# Patient Record
Sex: FEMALE | Race: WHITE | Employment: UNEMPLOYED | URBAN - METROPOLITAN AREA
[De-identification: names, ages, dates, MRNs, and addresses within clinical notes are randomized per-mention and may not be internally consistent; named-entity substitution may affect disease eponyms.]

---

## 2021-01-21 ENCOUNTER — VIRTUAL VISIT (OUTPATIENT)
Dept: FAMILY MEDICINE CLINIC | Age: 36
End: 2021-01-21
Payer: OTHER GOVERNMENT

## 2021-01-21 DIAGNOSIS — F33.8 SEASONAL AFFECTIVE DISORDER (HCC): Primary | ICD-10-CM

## 2021-01-21 DIAGNOSIS — L70.0 CYSTIC ACNE: ICD-10-CM

## 2021-01-21 PROCEDURE — 99203 OFFICE O/P NEW LOW 30 MIN: CPT | Performed by: NURSE PRACTITIONER

## 2021-01-21 RX ORDER — AZELAIC ACID 0.15 G/G
GEL TOPICAL 2 TIMES DAILY
COMMUNITY
End: 2021-01-21 | Stop reason: SDUPTHER

## 2021-01-21 RX ORDER — CHLORTHALIDONE 25 MG/1
25 TABLET ORAL DAILY
COMMUNITY
End: 2021-05-19 | Stop reason: SDUPTHER

## 2021-01-21 RX ORDER — SERTRALINE HYDROCHLORIDE 100 MG/1
100 TABLET, FILM COATED ORAL DAILY
COMMUNITY
End: 2021-05-10 | Stop reason: SDUPTHER

## 2021-01-21 NOTE — PROGRESS NOTES
Jerrod Myers was seen by synchronous (real-time) audio-video technology DOXY on 1/21/2021. Consent: Jerrod Myers, who was seen by synchronous (real-time) audio-video technology, and/or her healthcare decision maker, is aware that this patient-initiated, Telehealth encounter on 1/21/2021 is a billable service, with coverage as determined by her insurance carrier. She is aware that she may receive a bill and has provided verbal consent to proceed: yes  712  Subjective:     HPI:  Jerrod Myers is a 38 y.o. female who was seen for the following:     Presents to establish care with me as PCP. Previous PCP was out of the area. Just moved to the area. HTN:   She had a recent BP which was 136/85. Had preeclampsia with her pregnancies and her BP stayed high after the last pregnancy. Last physical was about 2 years ago. Had blood work in 8/2020. Last pap was 2018, always normal.     Cystic acne:   Uses azelaic acid cream.     Anxiety:   Taking zoloft 100 mg. Also seasonal affective disorder. Symptoms are manageble with the medication. Has been taking it for two years. Her  deployed several weeks ago and she did have a panic attack which was the first she has had in a long time. Review of Systems   Constitutional: Negative for appetite change, diaphoresis, fatigue and unexpected weight change. Eyes: Negative for visual disturbance. Respiratory: Negative for cough, chest tightness and shortness of breath. Cardiovascular: Negative for chest pain, palpitations and leg swelling. Gastrointestinal: Negative for abdominal distention, abdominal pain, blood in stool, constipation, diarrhea, nausea, rectal pain and vomiting. Endocrine: Negative for polydipsia, polyphagia and polyuria. Genitourinary: Negative for decreased urine volume, dysuria and frequency. Musculoskeletal: Negative for joint swelling and myalgias. Skin: Negative for rash and wound.         acne   Neurological: Negative for dizziness, weakness, light-headedness, numbness and headaches. Psychiatric/Behavioral: Negative for dysphoric mood and sleep disturbance. The patient is nervous/anxious. Past Medical History, Past Surgery History, Allergies, Social History, and Family History were reviewed and updated. Patient Active Problem List   Diagnosis Code    Seasonal affective disorder ([facility] [address] F33.8    Hypertension I10    Cystic acne L70.0    Anxiety F41.9     Past Medical History:   Diagnosis Date    Anxiety     Cystic acne     Hypertension     Seasonal affective disorder [facility])      Patient Care Team:  Kevyn Vyas NP as PCP - General (Nurse Practitioner)  Kevyn Vyas NP as PCP - [facility]    Past Surgical History:   Procedure Laterality Date    HX  SECTION      , , 2019    HX MENISCECTOMY     [address]    foot, repair of fracture    HX ORTHOPAEDIC  2003    left shoulder surgery     Family History   Problem Relation Age of Onset    Hypertension Mother     Hypertension Father     Cancer Father         prostate    Heart Attack Father         x2    Diabetes Maternal Aunt      Social History     Tobacco Use    Smoking status: Never Smoker    Smokeless tobacco: Never Used   Substance Use Topics    Alcohol use: Not Currently     Frequency: Never     Binge frequency: Never    Drug use: Never     Not on File  Current Outpatient Medications on File Prior to Visit   Medication Sig Dispense Refill    chlorthalidone (HYGROTON) 25 mg tablet Take 25 mg by mouth daily.  sertraline (ZOLOFT) 100 mg tablet Take 100 mg by mouth daily. No current facility-administered medications on file prior to visit.       Health Maintenance Due   Topic Date Due    DTaP/Tdap/Td series (1 - Tdap) 2004    PAP AKA CERVICAL CYTOLOGY  2004    Flu Vaccine (1) 2020         Objective     PHYSICAL EXAMINATION:    Vital Signs: (As obtained by patient/caregiver at home)   Patient-Reported Vitals 1/21/2021   Patient-Reported Weight 145   Patient-Reported Height 51   Patient-Reported LMP 12/23          General: Alert, cooperative, no distress   Mental  status: Normal mood, behavior, speech, dress, motor activity, and thought processes, able to follow commands   HENT: Normocephalic, atraumatic   Neck: No visualized mass   Resp: No respiratory distress   Neuro: No gross deficits   Skin: No discoloration or lesions of concern on visible areas   Psychiatric: Normal affect, consistent with stated mood, no evidence of hallucinations     Additional exam findings: none      LABS     TESTS      Assessment and Plan     1. Seasonal affective disorder ([address]  Well controlled with current med. Continue. 2. Cystic acne  Azelaic acid was effective previously. Restart.   - azelaic acid (AZELEX) 20 % topical cream; Apply  to affected area two (2) times a day. Dispense: 50 g; Refill: 11      Follow-up and Dispositions    · Return for Annual physical, in person. 712  We discussed the expected course, resolution and complications of the diagnosis(es) in detail. Medication risks, benefits, costs, interactions, and alternatives were discussed as indicated. I advised her to contact the office if her condition worsens, changes or fails to improve as anticipated. She expressed understanding with the diagnosis(es) and plan. Levsia Miller is a 38 y.o. female who was evaluated by a video visit encounter for concerns as above. Patient identification was verified prior to start of the visit. A caregiver was present when appropriate. Due to this being a TeleHealth encounter (During [facility]- public health emergency), evaluation of the following organ systems was limited: Vitals/Constitutional/EENT/Resp/CV/GI//MS/Neuro/Skin/Heme-Lymph-Imm.   Pursuant to the emergency declaration under the [facility] waiver authority and the Coronavirus Preparedness and Response Supplemental Appropriations Act, this Virtual  Visit was conducted, with patient's (and/or legal guardian's) consent, to reduce the patient's risk of exposure to COVID-19 and provide necessary medical care.     Services were provided through a video synchronous discussion virtually to substitute for in-person clinic visit.   Patient was located at home and provider was located at home.      Signed electronically by Ev Renee DNP, FNP-BC

## 2021-02-15 ENCOUNTER — HOSPITAL ENCOUNTER (OUTPATIENT)
Dept: LAB | Age: 36
Discharge: HOME | End: 2021-02-15
Payer: OTHER GOVERNMENT

## 2021-02-15 ENCOUNTER — OFFICE VISIT (OUTPATIENT)
Dept: FAMILY MEDICINE CLINIC | Age: 36
End: 2021-02-15
Payer: OTHER GOVERNMENT

## 2021-02-15 VITALS
DIASTOLIC BLOOD PRESSURE: 83 MMHG | SYSTOLIC BLOOD PRESSURE: 127 MMHG | TEMPERATURE: 97.5 F | HEART RATE: 58 BPM | OXYGEN SATURATION: 100 % | WEIGHT: 147 LBS | RESPIRATION RATE: 20 BRPM

## 2021-02-15 DIAGNOSIS — F33.8 SEASONAL AFFECTIVE DISORDER (HCC): ICD-10-CM

## 2021-02-15 DIAGNOSIS — L81.9 CHANGE IN MULTIPLE PIGMENTED SKIN LESIONS: ICD-10-CM

## 2021-02-15 DIAGNOSIS — Z13.220 SCREENING CHOLESTEROL LEVEL: ICD-10-CM

## 2021-02-15 DIAGNOSIS — F41.9 ANXIETY: ICD-10-CM

## 2021-02-15 DIAGNOSIS — Z12.4 SCREENING FOR CERVICAL CANCER: ICD-10-CM

## 2021-02-15 DIAGNOSIS — Z00.00 ANNUAL PHYSICAL EXAM: Primary | ICD-10-CM

## 2021-02-15 DIAGNOSIS — Z91.89 HISTORY OF SUN EXPOSURE, MODERATE: ICD-10-CM

## 2021-02-15 DIAGNOSIS — I10 ESSENTIAL HYPERTENSION: ICD-10-CM

## 2021-02-15 LAB
ALBUMIN SERPL-MCNC: 3.8 G/DL (ref 3.4–5)
ALBUMIN/GLOB SERPL: 1.2 {RATIO} (ref 0.8–1.7)
ALP SERPL-CCNC: 55 U/L (ref 45–117)
ALT SERPL-CCNC: 13 U/L (ref 13–56)
ANION GAP SERPL CALC-SCNC: 4 MMOL/L (ref 3–18)
APPEARANCE UR: CLEAR
AST SERPL-CCNC: 10 U/L (ref 10–38)
BASOPHILS # BLD: 0 K/UL (ref 0–0.1)
BASOPHILS NFR BLD: 1 % (ref 0–2)
BILIRUB SERPL-MCNC: 1 MG/DL (ref 0.2–1)
BILIRUB UR QL: NEGATIVE
BUN SERPL-MCNC: 14 MG/DL (ref 7–18)
BUN/CREAT SERPL: 22 (ref 12–20)
CALCIUM SERPL-MCNC: 8.5 MG/DL (ref 8.5–10.1)
CHLORIDE SERPL-SCNC: 104 MMOL/L (ref 100–111)
CHOLEST SERPL-MCNC: 182 MG/DL
CO2 SERPL-SCNC: 32 MMOL/L (ref 21–32)
COLOR UR: YELLOW
CREAT SERPL-MCNC: 0.63 MG/DL (ref 0.6–1.3)
DIFFERENTIAL METHOD BLD: ABNORMAL
EOSINOPHIL # BLD: 0.1 K/UL (ref 0–0.4)
EOSINOPHIL NFR BLD: 2 % (ref 0–5)
ERYTHROCYTE [DISTWIDTH] IN BLOOD BY AUTOMATED COUNT: 12.2 % (ref 11.6–14.5)
GLOBULIN SER CALC-MCNC: 3.3 G/DL (ref 2–4)
GLUCOSE SERPL-MCNC: 76 MG/DL (ref 74–99)
GLUCOSE UR STRIP.AUTO-MCNC: NEGATIVE MG/DL
HCT VFR BLD AUTO: 40.5 % (ref 35–45)
HDLC SERPL-MCNC: 50 MG/DL (ref 40–60)
HDLC SERPL: 3.6 {RATIO} (ref 0–5)
HGB BLD-MCNC: 14.2 G/DL (ref 12–16)
HGB UR QL STRIP: NEGATIVE
KETONES UR QL STRIP.AUTO: NEGATIVE MG/DL
LDLC SERPL CALC-MCNC: 117.6 MG/DL (ref 0–100)
LEUKOCYTE ESTERASE UR QL STRIP.AUTO: NEGATIVE
LIPID PROFILE,FLP: ABNORMAL
LYMPHOCYTES # BLD: 1.8 K/UL (ref 0.9–3.6)
LYMPHOCYTES NFR BLD: 34 % (ref 21–52)
MCH RBC QN AUTO: 32.1 PG (ref 24–34)
MCHC RBC AUTO-ENTMCNC: 35.1 G/DL (ref 31–37)
MCV RBC AUTO: 91.6 FL (ref 74–97)
MONOCYTES # BLD: 0.7 K/UL (ref 0.05–1.2)
MONOCYTES NFR BLD: 12 % (ref 3–10)
NEUTS SEG # BLD: 2.7 K/UL (ref 1.8–8)
NEUTS SEG NFR BLD: 51 % (ref 40–73)
NITRITE UR QL STRIP.AUTO: NEGATIVE
PH UR STRIP: 7 [PH] (ref 5–8)
PLATELET # BLD AUTO: 290 K/UL (ref 135–420)
PMV BLD AUTO: 10.1 FL (ref 9.2–11.8)
POTASSIUM SERPL-SCNC: 3.3 MMOL/L (ref 3.5–5.5)
PROT SERPL-MCNC: 7.1 G/DL (ref 6.4–8.2)
PROT UR STRIP-MCNC: NEGATIVE MG/DL
RBC # BLD AUTO: 4.42 M/UL (ref 4.2–5.3)
SODIUM SERPL-SCNC: 140 MMOL/L (ref 136–145)
SP GR UR REFRACTOMETRY: 1.01 (ref 1–1.03)
TRIGL SERPL-MCNC: 72 MG/DL (ref ?–150)
TSH SERPL DL<=0.05 MIU/L-ACNC: 1.48 UIU/ML (ref 0.36–3.74)
UROBILINOGEN UR QL STRIP.AUTO: 0.2 EU/DL (ref 0.2–1)
VLDLC SERPL CALC-MCNC: 14.4 MG/DL
WBC # BLD AUTO: 5.4 K/UL (ref 4.6–13.2)

## 2021-02-15 PROCEDURE — 81003 URINALYSIS AUTO W/O SCOPE: CPT

## 2021-02-15 PROCEDURE — 80061 LIPID PANEL: CPT

## 2021-02-15 PROCEDURE — 36415 COLL VENOUS BLD VENIPUNCTURE: CPT

## 2021-02-15 PROCEDURE — 88175 CYTOPATH C/V AUTO FLUID REDO: CPT

## 2021-02-15 PROCEDURE — 85025 COMPLETE CBC W/AUTO DIFF WBC: CPT

## 2021-02-15 PROCEDURE — 99395 PREV VISIT EST AGE 18-39: CPT | Performed by: NURSE PRACTITIONER

## 2021-02-15 PROCEDURE — 84443 ASSAY THYROID STIM HORMONE: CPT

## 2021-02-15 PROCEDURE — 87624 HPV HI-RISK TYP POOLED RSLT: CPT

## 2021-02-15 PROCEDURE — 80053 COMPREHEN METABOLIC PANEL: CPT

## 2021-02-15 NOTE — PROGRESS NOTES
Subjective     Patient ID:  Patricia Ramos is a 38 y.o. ( 1985) female who presents for the following:   Physical      HPI     Presents for physical. Feeling well overall. Hypertension follow up:  Taking medications as prescribed: Yes  Checking BP at home: Yes 120-130/80s   Symptoms: none  Low sodium diet: Yes, mostly  Exercise: Yes, works out twice a week, cardio/core, active on the other days. Anxiety:   Taking sertraline which helps make the symptoms manageable. Worse during the fall and winter - seasonal affective disorder. Skin changes:   Reports that she had heavy sun exposure while living in [facility] and has noticed multiple new pigmented skin lesions in sun exposed areas. Review of Systems   Constitutional: Negative for appetite change, diaphoresis, fatigue, fever and unexpected weight change. HENT: Negative for congestion, sore throat and trouble swallowing. Eyes: Negative for visual disturbance. Respiratory: Negative for cough, chest tightness and shortness of breath. Cardiovascular: Negative for chest pain, palpitations and leg swelling. Gastrointestinal: Negative for abdominal distention, abdominal pain, anal bleeding, blood in stool, constipation, diarrhea, nausea, rectal pain and vomiting. Endocrine: Negative for polydipsia, polyphagia and polyuria. Genitourinary: Negative for decreased urine volume, difficulty urinating, dysuria, frequency, hematuria and urgency. Musculoskeletal: Negative for arthralgias, joint swelling and myalgias. Skin: Positive for color change. Negative for rash and wound. Neurological: Negative for dizziness, syncope, weakness, light-headedness, numbness and headaches. Psychiatric/Behavioral: Negative for dysphoric mood, sleep disturbance and suicidal ideas. The patient is nervous/anxious. Past Medical History, Past Surgery History, Allergies, Social History, and Family History were reviewed and updated. Patient Active Problem List   Diagnosis Code    Seasonal affective disorder ([address] F33.8    Hypertension I10    Cystic acne L70.0    Anxiety F41.9     Past Medical History:   Diagnosis Date    Anxiety     Cystic acne     Hypertension     Seasonal affective disorder [facility])      Patient Care Team:  John Rangel NP as PCP - General (Nurse Practitioner)  John Rangel [facility] Empaneled Provider    Past Surgical History:   Procedure Laterality Date    HX  SECTION      , , 2019    HX MENISCECTOMY     [address]    foot, repair of fracture    HX ORTHOPAEDIC  2003    left shoulder surgery     Family History   Problem Relation Age of Onset    Hypertension Mother     Hypertension Father     Cancer Father         prostate    Heart Attack Father         x2    Diabetes Maternal Aunt      Social History     Tobacco Use    Smoking status: Never Smoker    Smokeless tobacco: Never Used   Substance Use Topics    Alcohol use: Not Currently     Frequency: Never     Binge frequency: Never    Drug use: Never     Not on File  Current Outpatient Medications on File Prior to Visit   Medication Sig Dispense Refill    chlorthalidone (HYGROTON) 25 mg tablet Take 25 mg by mouth daily.  sertraline (ZOLOFT) 100 mg tablet Take 100 mg by mouth daily.  azelaic acid (AZELEX) 20 % topical cream Apply  to affected area two (2) times a day. 50 g 11     No current facility-administered medications on file prior to visit. Health Maintenance Due   Topic Date Due    Hepatitis C Screening  Never done    DTaP/Tdap/Td series (1 - Tdap) Never done    Flu Vaccine (1) Never done         Objective     Visit Vitals  /83   Pulse (!) 58   Temp 97.5 °F (36.4 °C)   Resp 20   Wt 147 lb (66.7 kg)   LMP 2021   SpO2 100%     Patient's last menstrual period was 2021. Physical Exam  Constitutional:       General: She is not in acute distress.      Appearance: Normal appearance. She is well-developed. She is not diaphoretic. HENT:      Head: Normocephalic and atraumatic. Right Ear: Hearing, tympanic membrane, ear canal and external ear normal.      Left Ear: Hearing, tympanic membrane, ear canal and external ear normal.      Nose: Nose normal.   Eyes:      Conjunctiva/sclera: Conjunctivae normal.      Pupils: Pupils are equal, round, and reactive to light. Neck:      Musculoskeletal: Neck supple. Thyroid: No thyroid mass or thyromegaly. Vascular: No carotid bruit. Cardiovascular:      Rate and Rhythm: Normal rate and regular rhythm. Heart sounds: Normal heart sounds. No murmur. No friction rub. No gallop. Pulmonary:      Effort: Pulmonary effort is normal. No respiratory distress. Breath sounds: Normal breath sounds. Abdominal:      General: Bowel sounds are normal. There is no distension. Palpations: Abdomen is soft. There is no mass or pulsatile mass. Tenderness: There is no abdominal tenderness. Genitourinary:     Labia:         Right: No rash, tenderness, lesion or injury. Left: No rash, tenderness, lesion or injury. Vagina: No signs of injury and foreign body. No vaginal discharge, erythema, tenderness or bleeding. Cervix: No cervical motion tenderness, discharge or friability. Adnexa:         Right: No mass, tenderness or fullness. Left: No mass, tenderness or fullness. Musculoskeletal: Normal range of motion. General: No tenderness or deformity. Right lower leg: No edema. Left lower leg: No edema. Lymphadenopathy:      Cervical: No cervical adenopathy. Skin:     General: Skin is warm and dry. Findings: No rash. Neurological:      Mental Status: She is alert and oriented to person, place, and time. Cranial Nerves: No cranial nerve deficit. Sensory: No sensory deficit.       Gait: Gait normal.      Deep Tendon Reflexes: Reflexes are normal and symmetric. Psychiatric:         Behavior: Behavior normal.         Thought Content: Thought content normal.         Judgment: Judgment normal.           LABS     TESTS      Assessment and Plan     1. Annual physical exam  Encouraged healthy lifestyle. 2. Essential hypertension  Well controlled per home readings. Fasting labs today. - CBC WITH AUTOMATED DIFF; Future  - METABOLIC PANEL, COMPREHENSIVE; Future  - TSH 3RD GENERATION; Future  - URINALYSIS W/ RFLX MICROSCOPIC; Future    3. Seasonal affective disorder ([address]  4. Anxiety  Well controlled on current meds. Continue. 5. Screening cholesterol level  - LIPID PANEL; Future    6. Screening for cervical cancer  Pap done today. - PAP IG, APTIMA HPV AND RFX 16/18,45 ([id number]); Future    7. History of sun exposure, moderate  - REFERRAL TO DERMATOLOGY    8. Change in multiple pigmented skin lesions  - REFERRAL TO DERMATOLOGY      Follow-up and Dispositions    · Return in about 6 months (around 8/15/2021) for High blood pressure follow up, in person. Risks, benefits, and alternatives of the medications and treatment plan prescribed today were discussed, and patient expressed understanding. Printed after visit summary was given to patient and reviewed. All patient questions and concerns were addressed. Plan follow-up as discussed or as needed if any worsening symptoms or change in condition.            Signed electronically by Jamal Guevara DNP, SULTANA-BC

## 2021-02-15 NOTE — PROGRESS NOTES
Thomas Loja presents today for   Chief Complaint   Patient presents with    Physical       Is someone accompanying this pt? no    Is the patient using any DME equipment during OV? no    Depression Screening:  No flowsheet data found. Learning Assessment:  No flowsheet data found. Abuse Screening:  No flowsheet data found. Fall Risk  Fall Risk Assessment, last 12 mths 2/15/2021   Able to walk? Yes       Health Maintenance reviewed and discussed and ordered per Provider. Health Maintenance Due   Topic Date Due    DTaP/Tdap/Td series (1 - Tdap) 03/12/2004    PAP AKA CERVICAL CYTOLOGY  03/12/2004    Flu Vaccine (1) 09/01/2020   . Coordination of Care:  1. Have you been to the ER, urgent care clinic since your last visit? Hospitalized since your last visit? no    2. Have you seen or consulted any other health care providers outside of the [address] [place] [address] since your last visit? Include any pap smears or colon screening.  no      Last  Checked na  Last UDS Checked na  Last Pain contract signed: etienne

## 2021-02-15 NOTE — PATIENT INSTRUCTIONS
We will call or send you a MePlease message or letter regarding your lab/imaging results. If you do not hear anything in 2 weeks, then call our office back for your results. Healthy Lifestyle Choices The choices you make every day determine whether you are likely to stay healthy or be at risk of developing or worsening chronic diseases like diabetes and high blood pressure. Weight: Your There is no height or weight on file to calculate BMI. Body mass index or BMI is only an estimate and does not include factors such as your muscle mass. In general it is healthy to have a BMI between 18.5 and 25. Nutrition: I recommend a diet high in vegetables, low in carbs and sugars, and low in processed foods. The Mediterranean diet is a good guideline for most people even if you are already at a healthy weight. If you would like more guidance, schedule an appointment with me to discuss your nutrition. Hydration: Drink about 8-10 cups of water daily. Avoid soda and other sweetened beverages. Exercise: Aim for at least 30 minutes of physical activity 5 days a week. If you have been sedentary for a while, start with light exercise and gradually increase your activity. Exercise should be something you enjoy and can easily incorporate into your daily routine. Examples: a walk at lunchtime, playing outdoors with your kids, swimming, dancing, yoga, etc.  
 
Sleep: Most people need 7-8 hours of uninterrupted sleep. Talk to me if you are having problems sleeping. Stress: Get at least 20-30 minutes of relaxation daily such as meditation, time with family and friends, pets, exercise, hobbies, etc.  
 
Seatbelt: Always wear your seatbelt! Sunscreen: A little sun exposure (10-20 minutes per day depending on your skin tone) supports healthy vitamin D levels, but avoid excessive sun exposure and sunburn. Dental care: Get a dental check-up and cleaning every 6 months.

## 2021-02-25 DIAGNOSIS — E87.6 HYPOKALEMIA: Primary | ICD-10-CM

## 2021-05-10 DIAGNOSIS — F33.8 SEASONAL AFFECTIVE DISORDER (HCC): ICD-10-CM

## 2021-05-10 DIAGNOSIS — F41.9 ANXIETY: Primary | ICD-10-CM

## 2021-05-10 DIAGNOSIS — L70.0 CYSTIC ACNE: ICD-10-CM

## 2021-05-10 RX ORDER — SERTRALINE HYDROCHLORIDE 100 MG/1
100 TABLET, FILM COATED ORAL DAILY
Qty: 30 TAB | Refills: 0 | Status: SHIPPED | OUTPATIENT
Start: 2021-05-10 | End: 2021-05-19 | Stop reason: SDUPTHER

## 2021-05-19 ENCOUNTER — VIRTUAL VISIT (OUTPATIENT)
Dept: FAMILY MEDICINE CLINIC | Age: 36
End: 2021-05-19
Payer: OTHER GOVERNMENT

## 2021-05-19 DIAGNOSIS — F33.8 SEASONAL AFFECTIVE DISORDER (HCC): ICD-10-CM

## 2021-05-19 DIAGNOSIS — I10 ESSENTIAL HYPERTENSION: Primary | ICD-10-CM

## 2021-05-19 DIAGNOSIS — F41.9 ANXIETY: ICD-10-CM

## 2021-05-19 PROCEDURE — 99212 OFFICE O/P EST SF 10 MIN: CPT | Performed by: NURSE PRACTITIONER

## 2021-05-19 RX ORDER — SERTRALINE HYDROCHLORIDE 100 MG/1
100 TABLET, FILM COATED ORAL DAILY
Qty: 90 TABLET | Refills: 0 | Status: SHIPPED | OUTPATIENT
Start: 2021-05-19 | End: 2021-08-17

## 2021-05-19 RX ORDER — CHLORTHALIDONE 25 MG/1
25 TABLET ORAL DAILY
Qty: 90 TABLET | Refills: 0 | Status: SHIPPED | OUTPATIENT
Start: 2021-05-19 | End: 2021-09-01 | Stop reason: SDUPTHER

## 2021-05-19 NOTE — PROGRESS NOTES
HISTORY OF PRESENT ILLNESS  Sola Mendez is a 43 y.o. female seen to establish care and follow-up on hypertension and anxiety  Sola Mendez, who was evaluated through a synchronous (real-time) audio-video encounter, and/or her healthcare decision maker, is aware that it is a billable service, with coverage as determined by her insurance carrier. She provided verbal consent to proceed: Yes, and patient identification was verified. This visit was conducted pursuant to the emergency declaration under the [facility], [address] [place] [address] and the [employer] General Act. A caregiver was present when appropriate. Ability to conduct physical exam was limited. The patient was located in a state where the provider was credentialed to provide care. --Yonatan Watson NP on 5/19/2021 at 9:45 AM  HPI  NP Agnes Hayward patient. Patient is establishing care with me today. Patient has no complaints. Patient has a history of hypertension cystic acne, seasonal affective disorder and anxiety. Patient says she is doing well and she looks well. She is asking for refills on her chlorthalidone and and Zoloft. Patient is in office blood pressure February 2021 was 127/83. Patient denies fever, chills, chest pain, shortness of breath, abdomen pain or dark tarry stool. Patient reports her appetite is good, no urinary issues and regular bowel movements. Patient had covid last year while living in [place]. Doesn't want covid vaccine, \"not enough research. \".   Patient reports that she and her family are going on vacation and she would like to follow-up in August for a face-to-face appointment. Patient and I discussed at home blood pressure monitoring. However patient gets anxious when she checks her blood pressure so she says she does not do it that often.     Review of Systems   Constitutional: Negative for chills, fever, malaise/fatigue and weight loss. HENT: Negative. Eyes: Negative. Respiratory: Negative. Cardiovascular: Negative. Gastrointestinal: Negative. Genitourinary: Negative. Musculoskeletal: Negative. Skin: Negative. Neurological: Negative. Endo/Heme/Allergies: Negative. Psychiatric/Behavioral: Negative for depression. The patient is not nervous/anxious. Patient reports Zoloft has been working well for her     There were no vitals taken for this visit. Physical Exam  Constitutional:       General: She is not in acute distress. Appearance: Normal appearance. She is normal weight. She is not ill-appearing. Eyes:      General:         Right eye: No discharge. Left eye: No discharge. Conjunctiva/sclera: Conjunctivae normal.   Neck:      Comments: No visible mass  Pulmonary:      Effort: Pulmonary effort is normal.   Neurological:      Mental Status: She is alert and oriented to person, place, and time. Psychiatric:         Mood and Affect: Mood normal.         Behavior: Behavior normal.         Thought Content: Thought content normal.         Judgment: Judgment normal.     ASSESSMENT and PLAN    ICD-10-CM ICD-9-CM    1. Essential hypertension  I10 401.9 chlorthalidone (HYGROTON) 25 mg tablet   2. Anxiety  F41.9 300.00 sertraline (ZOLOFT) 100 mg tablet   3. Seasonal affective disorder (HCC)  F33.8 296.99 sertraline (ZOLOFT) 100 mg tablet     Follow-up and Dispositions    · Return in about 3 months (around 8/19/2021). 50% of 15 minutes spent on counseling and managing care. Follow-up in 3 months.

## 2021-09-01 DIAGNOSIS — F33.8 SEASONAL AFFECTIVE DISORDER (HCC): Primary | ICD-10-CM

## 2021-09-01 DIAGNOSIS — I10 ESSENTIAL HYPERTENSION: ICD-10-CM

## 2021-09-01 DIAGNOSIS — F41.9 ANXIETY: ICD-10-CM

## 2021-09-01 DIAGNOSIS — L70.0 CYSTIC ACNE: ICD-10-CM

## 2021-09-01 RX ORDER — CHLORTHALIDONE 25 MG/1
25 TABLET ORAL DAILY
Qty: 90 TABLET | Refills: 0 | Status: SHIPPED | OUTPATIENT
Start: 2021-09-01 | End: 2021-10-12 | Stop reason: SDUPTHER

## 2021-09-01 RX ORDER — SERTRALINE HYDROCHLORIDE 100 MG/1
100 TABLET, FILM COATED ORAL DAILY
Qty: 90 TABLET | Refills: 0 | Status: SHIPPED | OUTPATIENT
Start: 2021-09-01 | End: 2021-10-12 | Stop reason: SDUPTHER

## 2021-10-12 ENCOUNTER — HOSPITAL ENCOUNTER (OUTPATIENT)
Dept: LAB | Age: 36
Discharge: HOME | End: 2021-10-12
Payer: OTHER GOVERNMENT

## 2021-10-12 ENCOUNTER — OFFICE VISIT (OUTPATIENT)
Dept: FAMILY MEDICINE CLINIC | Age: 36
End: 2021-10-12
Payer: OTHER GOVERNMENT

## 2021-10-12 VITALS
HEART RATE: 87 BPM | BODY MASS INDEX: 28.25 KG/M2 | WEIGHT: 149.6 LBS | SYSTOLIC BLOOD PRESSURE: 119 MMHG | DIASTOLIC BLOOD PRESSURE: 83 MMHG | HEIGHT: 61 IN | TEMPERATURE: 98.1 F | OXYGEN SATURATION: 99 %

## 2021-10-12 DIAGNOSIS — Z86.39 HISTORY OF HYPOKALEMIA: ICD-10-CM

## 2021-10-12 DIAGNOSIS — R10.2 PELVIC PAIN: ICD-10-CM

## 2021-10-12 DIAGNOSIS — Z13.1 SCREENING FOR DIABETES MELLITUS: ICD-10-CM

## 2021-10-12 DIAGNOSIS — Z13.29 SCREENING FOR THYROID DISORDER: ICD-10-CM

## 2021-10-12 DIAGNOSIS — Z13.0 SCREENING FOR DEFICIENCY ANEMIA: ICD-10-CM

## 2021-10-12 DIAGNOSIS — F33.8 SEASONAL AFFECTIVE DISORDER (HCC): ICD-10-CM

## 2021-10-12 DIAGNOSIS — I10 PRIMARY HYPERTENSION: ICD-10-CM

## 2021-10-12 DIAGNOSIS — I10 ESSENTIAL HYPERTENSION: ICD-10-CM

## 2021-10-12 DIAGNOSIS — Z00.00 PREVENTATIVE HEALTH CARE: ICD-10-CM

## 2021-10-12 DIAGNOSIS — F41.9 ANXIETY: ICD-10-CM

## 2021-10-12 DIAGNOSIS — Z11.52 ENCOUNTER FOR SCREENING FOR COVID-19: ICD-10-CM

## 2021-10-12 DIAGNOSIS — Z13.220 SCREENING FOR HYPERLIPIDEMIA: ICD-10-CM

## 2021-10-12 DIAGNOSIS — I10 PRIMARY HYPERTENSION: Primary | ICD-10-CM

## 2021-10-12 LAB
25(OH)D3 SERPL-MCNC: 40.2 NG/ML (ref 30–100)
ALBUMIN SERPL-MCNC: 3.8 G/DL (ref 3.4–5)
ALBUMIN/GLOB SERPL: 1.2 {RATIO} (ref 0.8–1.7)
ALP SERPL-CCNC: 63 U/L (ref 45–117)
ALT SERPL-CCNC: 13 U/L (ref 13–56)
ANION GAP SERPL CALC-SCNC: 3 MMOL/L (ref 3–18)
APPEARANCE UR: CLEAR
AST SERPL-CCNC: 8 U/L (ref 10–38)
BASOPHILS # BLD: 0 K/UL (ref 0–0.1)
BASOPHILS NFR BLD: 1 % (ref 0–2)
BILIRUB SERPL-MCNC: 0.4 MG/DL (ref 0.2–1)
BILIRUB UR QL: NEGATIVE
BUN SERPL-MCNC: 14 MG/DL (ref 7–18)
BUN/CREAT SERPL: 18 (ref 12–20)
CALCIUM SERPL-MCNC: 9.5 MG/DL (ref 8.5–10.1)
CHLORIDE SERPL-SCNC: 104 MMOL/L (ref 100–111)
CHOLEST SERPL-MCNC: 213 MG/DL
CO2 SERPL-SCNC: 33 MMOL/L (ref 21–32)
COLOR UR: YELLOW
CREAT SERPL-MCNC: 0.79 MG/DL (ref 0.6–1.3)
DIFFERENTIAL METHOD BLD: ABNORMAL
EOSINOPHIL # BLD: 0.1 K/UL (ref 0–0.4)
EOSINOPHIL NFR BLD: 1 % (ref 0–5)
ERYTHROCYTE [DISTWIDTH] IN BLOOD BY AUTOMATED COUNT: 12.1 % (ref 11.6–14.5)
EST. AVERAGE GLUCOSE BLD GHB EST-MCNC: 88 MG/DL
GLOBULIN SER CALC-MCNC: 3.3 G/DL (ref 2–4)
GLUCOSE SERPL-MCNC: 64 MG/DL (ref 74–99)
GLUCOSE UR STRIP.AUTO-MCNC: NEGATIVE MG/DL
HBA1C MFR BLD: 4.7 % (ref 4.2–5.6)
HCT VFR BLD AUTO: 41.4 % (ref 35–45)
HDLC SERPL-MCNC: 43 MG/DL (ref 40–60)
HDLC SERPL: 5 {RATIO} (ref 0–5)
HGB BLD-MCNC: 14.4 G/DL (ref 12–16)
HGB UR QL STRIP: NEGATIVE
KETONES UR QL STRIP.AUTO: NEGATIVE MG/DL
LDLC SERPL CALC-MCNC: 138.6 MG/DL (ref 0–100)
LEUKOCYTE ESTERASE UR QL STRIP.AUTO: NEGATIVE
LIPID PROFILE,FLP: ABNORMAL
LYMPHOCYTES # BLD: 1.6 K/UL (ref 0.9–3.6)
LYMPHOCYTES NFR BLD: 36 % (ref 21–52)
MCH RBC QN AUTO: 31.6 PG (ref 24–34)
MCHC RBC AUTO-ENTMCNC: 34.8 G/DL (ref 31–37)
MCV RBC AUTO: 91 FL (ref 78–100)
MONOCYTES # BLD: 0.5 K/UL (ref 0.05–1.2)
MONOCYTES NFR BLD: 11 % (ref 3–10)
NEUTS SEG # BLD: 2.3 K/UL (ref 1.8–8)
NEUTS SEG NFR BLD: 51 % (ref 40–73)
NITRITE UR QL STRIP.AUTO: NEGATIVE
PH UR STRIP: 7 [PH] (ref 5–8)
PLATELET # BLD AUTO: 322 K/UL (ref 135–420)
PMV BLD AUTO: 9.6 FL (ref 9.2–11.8)
POTASSIUM SERPL-SCNC: 3.6 MMOL/L (ref 3.5–5.5)
PROT SERPL-MCNC: 7.1 G/DL (ref 6.4–8.2)
PROT UR STRIP-MCNC: NEGATIVE MG/DL
RBC # BLD AUTO: 4.55 M/UL (ref 4.2–5.3)
SODIUM SERPL-SCNC: 140 MMOL/L (ref 136–145)
SP GR UR REFRACTOMETRY: 1 (ref 1–1.03)
T4 FREE SERPL-MCNC: 0.9 NG/DL (ref 0.7–1.5)
TRIGL SERPL-MCNC: 157 MG/DL (ref ?–150)
TSH SERPL DL<=0.05 MIU/L-ACNC: 1.07 UIU/ML (ref 0.36–3.74)
UROBILINOGEN UR QL STRIP.AUTO: 0.2 EU/DL (ref 0.2–1)
VLDLC SERPL CALC-MCNC: 31.4 MG/DL
WBC # BLD AUTO: 4.4 K/UL (ref 4.6–13.2)

## 2021-10-12 PROCEDURE — 36415 COLL VENOUS BLD VENIPUNCTURE: CPT

## 2021-10-12 PROCEDURE — 82306 VITAMIN D 25 HYDROXY: CPT

## 2021-10-12 PROCEDURE — 80053 COMPREHEN METABOLIC PANEL: CPT

## 2021-10-12 PROCEDURE — 84443 ASSAY THYROID STIM HORMONE: CPT

## 2021-10-12 PROCEDURE — 83036 HEMOGLOBIN GLYCOSYLATED A1C: CPT

## 2021-10-12 PROCEDURE — 81003 URINALYSIS AUTO W/O SCOPE: CPT

## 2021-10-12 PROCEDURE — 99214 OFFICE O/P EST MOD 30 MIN: CPT | Performed by: NURSE PRACTITIONER

## 2021-10-12 PROCEDURE — 85025 COMPLETE CBC W/AUTO DIFF WBC: CPT

## 2021-10-12 PROCEDURE — 84439 ASSAY OF FREE THYROXINE: CPT

## 2021-10-12 PROCEDURE — 80061 LIPID PANEL: CPT

## 2021-10-12 RX ORDER — SERTRALINE HYDROCHLORIDE 100 MG/1
100 TABLET, FILM COATED ORAL DAILY
Qty: 90 TABLET | Refills: 0 | Status: SHIPPED | OUTPATIENT
Start: 2021-10-12 | End: 2022-03-07 | Stop reason: SDUPTHER

## 2021-10-12 RX ORDER — CHLORTHALIDONE 25 MG/1
25 TABLET ORAL DAILY
Qty: 90 TABLET | Refills: 1 | Status: SHIPPED | OUTPATIENT
Start: 2021-10-12 | End: 2022-09-16 | Stop reason: SDUPTHER

## 2021-10-12 RX ORDER — CHOLECALCIFEROL (VITAMIN D3) 125 MCG
CAPSULE ORAL
COMMUNITY

## 2021-10-12 NOTE — PROGRESS NOTES
OFFICE NOTE    Shashi Romero is a 43 y.o. female presenting today for office visit. 10/12/2021  8:06 AM    Chief Complaint   Patient presents with    Follow-up     HTN     Other     request referral for dermatology due to sun spots.  Pelvic Pain     left side before cycle intermitent, heavy bleedings 10/1/21     HPI:   Patient in office for follow-up related to hypertension, seasonal affective disorder anxiety. She was in [facility] with her  job in [employer]. She has a bachelors in finance and wants to get her CPA. Her  is getting out of the [employer] and wants to be a stay at home dad. Her whole family got covid. Chago thfranky Feb 2020 and she was pretty sure it was covid. She would like to have a covid antibody test.     Tubal ligation 2019. She didn't her \"cycle\" back til 3/2020. She intermittently gets pelvic pain at the start of her periods. She thinks she has a cyst on her left. 5/19/2021, previous visit patient has no complaints. Patient has a history of hypertension cystic acne, seasonal affective disorder and anxiety. Patient says she is doing well and she looks well. She is asking for refills on her chlorthalidone and and Zoloft. Patient is in office blood pressure February 2021 was 127/83. Patients last menstrual period was, 10/1/2021    Smokes tobacco no, drinks alcohol or illicit drugs marijuana. Sexually active  and 3 children. Patient reports appetite is good, no urinary issues, sleeps well and regular bowel movements. Patient denies fever, chills, chest pain, shortness of breath, abdomen pain or dark tarry stool.     Covid vaccine, declined   Flu vaccine, declined   Pap smear, current and will establish care with GYN  Breast cancer screening at   Colorectal cancer screening at 37year old     ROS:    · General: negative for - chills, fever, weight changes or malaise  · HEENT: no sore throat, nasal congestion, vision problems or ear problems  · Respiratory: no cough, shortness of breath, or wheezing  · Cardiovascular: no chest pain, palpitations, or dyspnea on exertion  · Gastrointestinal: no abdominal pain, N/V, change in bowel habits, or black or bloody stools  · Musculoskeletal: no back pain, joint pain, joint stiffness, muscle pain or muscle weakness  · Neurological: no numbness, tingling, headache or dizziness  · Endo:  No polyuria or polydipsia. · : no hematuria, dysuria, frequency, hesitancy, or nocturia. Pelvic before period.    · Skin: No itching or rash, no open skin, no unusual lesions   · Psychological: negative for - anxiety, depression, sleep disturbances, suicidal or homicidal ideations     PHQ Screening   3 most recent PHQ Screens 10/12/2021   Little interest or pleasure in doing things Not at all   Feeling down, depressed, irritable, or hopeless Not at all   Total Score PHQ 2 0       History  Past Medical History:   Diagnosis Date    Anxiety     Cystic acne     Hypertension     Seasonal affective disorder [facility])        Past Surgical History:   Procedure Laterality Date    HX  SECTION      , , 2019    HX MENISCECTOMY  2014    HX ORTHOPAEDIC  1998    foot, repair of fracture    HX ORTHOPAEDIC  2003    left shoulder surgery    HX TUBAL LIGATION  2019       Social History     Socioeconomic History    Marital status:      Spouse name: Not on file    Number of children: Not on file    Years of education: Not on file    Highest education level: Not on file   Occupational History    Occupation: [employer]   Tobacco Use    Smoking status: Never Smoker    Smokeless tobacco: Never Used   Vaping Use    Vaping Use: Never used   Substance and Sexual Activity    Alcohol use: Not Currently    Drug use: Never    Sexual activity: Yes     Partners: Male     Birth control/protection: Surgical   Other Topics Concern    Not on file   Social History Narrative    Not on file     Social Determinants of Health     Financial Resource Strain:     Difficulty of Paying Living Expenses:    Food Insecurity:     Worried About Running Out of Food in the Last Year:     [address] in the Last Year:    Transportation Needs:     Lack of Transportation (Medical):  Lack of Transportation (Non-Medical):    Physical Activity:     Days of Exercise per Week:     Minutes of Exercise per Session:    Stress:     Feeling of Stress :    Social Connections:     Frequency of Communication with Friends and Family:     Frequency of Social Gatherings with Friends and Family:     Attends Voodoo Services:     Active Member of Clubs or Organizations:     Attends Club or Organization Meetings:     Marital Status:    Intimate Partner Violence:     Fear of Current or Ex-Partner:     Emotionally Abused:     Physically Abused:     Sexually Abused: Allergies   Allergen Reactions    Latex Hives    Penicillins Anaphylaxis    Clindamycin Rash       Current Outpatient Medications   Medication Sig Dispense Refill    cholecalciferol, vitamin D3, (Vitamin D3) 50 mcg (2,000 unit) tab Take  by mouth.  sertraline (ZOLOFT) 100 mg tablet Take 1 Tablet by mouth daily. 90 Tablet 0    chlorthalidone (HYGROTON) 25 mg tablet Take 1 Tablet by mouth daily. Indications: high blood pressure 90 Tablet 1    azelaic acid (AZELEX) 20 % topical cream Apply  to affected area two (2) times a day. 50 g 1     Patient Care Team:  Patient Care Team:  Gerri Ramirez NP as PCP - General (Nurse Practitioner)  Gerri Ramirez NP as PCP - [facility] Provider    LABS:  None new to review    RADIOLOGY:  None new to review    Physical Exam  Patient appears well, she is pleasant, alert, oriented x 3, in no distress. Slight obese. ENT normal.  Neck supple. No adenopathy or thyromegaly. RAY. Lungs are clear, good air entry, no wheezes, rhonchi or rales. Cardiovascular, S1 and S2 normal, no murmurs, regular rate and rhythm. No LAD.   Chest wall negative for tenderness  Abdomen is soft without tenderness, guarding, mass or organomegaly. /Anorectal, deferred. Muscleskeletal, no swelling, no tenderness, no injury. Extremities show no edema, normal peripheral pulses. Neurological is normal without focal findings. Skin: no concerning lesions. Psych: normal affect. Mood good. Oriented x 3. Judgement and insight intact. Vitals:    10/12/21 0906   BP: 119/83   Pulse: 87   Temp: 98.1 °F (36.7 °C)   TempSrc: Temporal   SpO2: 99%   Weight: 149 lb 9.6 oz (67.9 kg)   Height: 5' 1\" (1.549 m)   PainSc:   0 - No pain       Assessment and Plan    Diagnoses and all orders for this visit:    Primary hypertension  -     METABOLIC PANEL, COMPREHENSIVE; Future    Preventative health care  -     CBC WITH AUTOMATED DIFF; Future  -     URINALYSIS W/ RFLX MICROSCOPIC; Future  -     VITAMIN D, 25 HYDROXY; Future    Screening for thyroid disorder  -     T4, FREE; Future  -     TSH 3RD GENERATION; Future    Screening for diabetes mellitus  -     HEMOGLOBIN A1C WITH EAG; Future    Screening for deficiency anemia  -     CBC WITH AUTOMATED DIFF; Future    History of hypokalemia  -     METABOLIC PANEL, COMPREHENSIVE; Future    Screening for hyperlipidemia  -     LIPID PANEL; Future    Pelvic pain  -     REFERRAL TO GYNECOLOGY    Encounter for screening for COVID-19  -     SARS-COV-2 AB, IGG    Seasonal affective disorder (HCC)  -     sertraline (ZOLOFT) 100 mg tablet; Take 1 Tablet by mouth daily. , Normal, Disp-90 Tablet, R-0    Anxiety  -     sertraline (ZOLOFT) 100 mg tablet; Take 1 Tablet by mouth daily. , Normal, Disp-90 Tablet, R-0    Essential hypertension  -     chlorthalidone (HYGROTON) 25 mg tablet; Take 1 Tablet by mouth daily. Indications: high blood pressure, Normal, Disp-90 Tablet, R-1    *Plan of care reviewed with patient. Patient in agreement with plan and expresses understanding.  All questions answered and patient encouraged to call or RTO if further questions or concerns. 50% of 30 minutes spent on counseling and managing her care. Follow-up and Dispositions    · Return in about 3 months (around 1/12/2022).        ERICA Robert

## 2021-10-12 NOTE — PROGRESS NOTES
Chief Complaint   Patient presents with    Follow-up     HTN     Other     request referral for dermatology due to sun spots. 1. Have you been to the ER, urgent care clinic since your last visit? Hospitalized since your last visit? No    2. Have you seen or consulted any other health care providers outside of the [address] [place] [address] since your last visit? Include any pap smears or colon screening. No     Health Maintenance Due   Topic Date Due    Hepatitis C Screening  Never done    COVID-19 Vaccine (1) Never done    DTaP/Tdap/Td series (1 - Tdap) Never done    Flu Vaccine (1) Never done     Not taken covid 19 vaccine   Patient declined flu vaccine today.

## 2021-11-30 DIAGNOSIS — L70.0 CYSTIC ACNE: ICD-10-CM

## 2022-01-21 ENCOUNTER — VIRTUAL VISIT (OUTPATIENT)
Dept: FAMILY MEDICINE CLINIC | Age: 37
End: 2022-01-21
Payer: OTHER GOVERNMENT

## 2022-01-21 DIAGNOSIS — B37.9 YEAST INFECTION: Primary | ICD-10-CM

## 2022-01-21 DIAGNOSIS — N60.11 CHRONIC MASTITIS OF RIGHT BREAST: ICD-10-CM

## 2022-01-21 PROCEDURE — 99443 PR PHYS/QHP TELEPHONE EVALUATION 21-30 MIN: CPT | Performed by: NURSE PRACTITIONER

## 2022-01-21 RX ORDER — SULFAMETHOXAZOLE AND TRIMETHOPRIM 800; 160 MG/1; MG/1
1 TABLET ORAL 2 TIMES DAILY
Qty: 20 TABLET | Refills: 0 | Status: SHIPPED | OUTPATIENT
Start: 2022-01-21 | End: 2022-01-31

## 2022-01-21 RX ORDER — FLUCONAZOLE 150 MG/1
TABLET ORAL
Qty: 2 TABLET | Refills: 0 | Status: SHIPPED | OUTPATIENT
Start: 2022-01-21 | End: 2022-02-10 | Stop reason: ALTCHOICE

## 2022-01-21 NOTE — PROGRESS NOTES
Hemoglobin A1c 4.7%. Vitamin D normal at 40.2. Cholesterol high at 213, triglycerides high at 157 and LDL high at 138.6. HDL good cholesterol low at 43. T4 and TSH normal.  Metabolic panel unremarkable. CBC unremarkable.   Urinalysis normal.

## 2022-01-21 NOTE — PROGRESS NOTES
OFFICE NOTE    Franci Shore is a 43 y.o. female presenting today for office visit. Franci Shore, who was evaluated through a synchronous (real-time) audio only encounter, and/or her healthcare decision maker, is aware that it is a billable service, which includes applicable co-pays, with coverage as determined by her insurance carrier. She provided verbal consent to proceed and patient identification was verified. This visit was conducted pursuant to the emergency declaration under the [facility], [address] [place] [address] and the [employer] Act. A caregiver was present when appropriate. Ability to conduct physical exam was limited. The patient was located at home in a state where the provider was licensed to provide care. --Damian Young NP on 1/21/2022 at 1:27 PM    1/21/2022  1:26 PM    Chief Complaint   Patient presents with    Yeast Infection     Vaginal itching for several days    Breast pain     Chronic mastitis for several days with warmth and streaks on right breast     HPI:   Audio only visit related to vaginal itching. Patient reports several days ago she had mastitis of the right breast this is a chronic issue for her. She says several days ago she had streaks on her breast with warmth and pain. She says the symptoms have resolved. Patient says she is not pregnant and she has had a tubal ligation. Patient reports no fever, chills shortness of breath chest pain or abdomen pain at this time. Labs 10/12/2021  Hemoglobin A1c 4.7%. Vitamin D normal at 40.2. Cholesterol high at 213, triglycerides high at 157 and LDL high at 138.6. HDL good cholesterol low at 43. T4 and TSH normal.  Metabolic panel unremarkable. CBC unremarkable. Urinalysis normal.    Patient reports appetite is good, no urinary issues, sleeps well and regular bowel movements.   Patient denies fever, chills, chest pain, shortness of breath, abdomen pain or dark tarry stool. Previous visit 10/12/2021, patient in office for follow-up related to hypertension, seasonal affective disorder anxiety. She was in [place] with her  job in [employer]. She has a bachelors in finance and wants to get her CPA. Her  is getting out of the [employer] and wants to be a stay at home dad.     Her whole family got covid. Chago thur Feb 2020 and she was pretty sure it was covid. She would like to have a covid antibody test.      Tubal ligation 2019. She didn't her \"cycle\" back til 3/2020. She intermittently gets pelvic pain at the start of her periods. She thinks she has a cyst on her left.      5/19/2021, previous visit patient has no complaints.  Patient has a history of hypertension cystic acne, seasonal affective disorder and anxiety.  Patient says she is doing well and she looks well.  She is asking for refills on her chlorthalidone and and Zoloft.  Patient is in office blood pressure February 2021 was 127/83. ROS:    · General: negative for - chills, fever, weight changes or malaise  · HEENT: no sore throat, nasal congestion, vision problems or ear problems  · Respiratory: no cough, shortness of breath, or wheezing  · Cardiovascular: no chest pain, palpitations, or dyspnea on exertion  · Gastrointestinal: no abdominal pain, N/V, change in bowel habits, or black or bloody stools  · Musculoskeletal: no back pain, joint pain, joint stiffness, muscle pain or muscle weakness  · Neurological: no numbness, tingling, headache or dizziness  · Endo:  No polyuria or polydipsia. · : no hematuria, dysuria, frequency, hesitancy, or nocturia.   Positive for vaginal itching  · Skin: Right side breast pain with warmth and streaks recently  · Psychological: negative for - anxiety, depression, sleep disturbances, suicidal or homicidal ideations     History  Past Medical History:   Diagnosis Date    Anxiety     Cystic acne     Hypertension  Seasonal affective disorder [facility])        Past Surgical History:   Procedure Laterality Date    HX  SECTION      , 2013, 2019    HX MENISCECTOMY  2014     [address]    foot, repair of fracture    HX ORTHOPAEDIC  2003    left shoulder surgery    HX TUBAL LIGATION  2019       Social History     Socioeconomic History    Marital status:      Spouse name: Not on file    Number of children: Not on file    Years of education: Not on file    Highest education level: Not on file   Occupational History    Occupation: [employer]   Tobacco Use    Smoking status: Never Smoker    Smokeless tobacco: Never Used   Vaping Use    Vaping Use: Never used   Substance and Sexual Activity    Alcohol use: Not Currently    Drug use: Never    Sexual activity: Yes     Partners: Male     Birth control/protection: Surgical   Other Topics Concern    Not on file   Social History Narrative    Not on file     Social Determinants of Health     Financial Resource Strain:     Difficulty of Paying Living Expenses: Not on file   Food Insecurity:     Worried About [address] in the Last Year: Not on file    [address] in the Last Year: Not on file   Transportation Needs:     Lack of Transportation (Medical): Not on file    Lack of Transportation (Non-Medical):  Not on file   Physical Activity:     Days of Exercise per Week: Not on file    Minutes of Exercise per Session: Not on file   Stress:     Feeling of Stress : Not on file   Social Connections:     Frequency of Communication with Friends and Family: Not on file    Frequency of Social Gatherings with Friends and Family: Not on file    Attends Confucianism Services: Not on file    Active Member of Clubs or Organizations: Not on file    Attends Club or Organization Meetings: Not on file    Marital Status: Not on file   Intimate Partner Violence:     Fear of Current or Ex-Partner: Not on file    Emotionally Abused: Not on file   Olesya Toledo Physically Abused: Not on file    Sexually Abused: Not on file   Housing Stability:     Unable to Pay for Housing in the Last Year: Not on file    Number of Places Lived in the Last Year: Not on file    Unstable Housing in the Last Year: Not on file       Allergies   Allergen Reactions    Latex Hives    Penicillins Anaphylaxis    Clindamycin Rash       Current Outpatient Medications   Medication Sig Dispense Refill    fluconazole (DIFLUCAN) 150 mg tablet FDA advises cautious prescribing of oral fluconazole in pregnancy. Take 1 tablet now and 1 when you finish your antibiotic. 2 Tablet 0    trimethoprim-sulfamethoxazole (BACTRIM DS, SEPTRA DS) 160-800 mg per tablet Take 1 Tablet by mouth two (2) times a day for 10 days. 20 Tablet 0    azelaic acid (AZELEX) 20 % topical cream Apply  to affected area two (2) times a day. 50 g 1    cholecalciferol, vitamin D3, (Vitamin D3) 50 mcg (2,000 unit) tab Take  by mouth.  sertraline (ZOLOFT) 100 mg tablet Take 1 Tablet by mouth daily. 90 Tablet 0    chlorthalidone (HYGROTON) 25 mg tablet Take 1 Tablet by mouth daily. Indications: high blood pressure 90 Tablet 1     Patient Care Team:  Patient Care Team:  Silver Tobar NP as PCP - General (Nurse Practitioner)  Silver Tobar NP as PCP - [facility] Empaneled Provider    Physical exam  audio only  Patient appears well, is pleasant, alert, oriented x 3, in no distress. Lungs, sound to have normal respiratory effort     Psych: normal affect. Mood good. Oriented x 3. Judgement and insight intact. There were no vitals filed for this visit. Assessment and Plan    Diagnoses and all orders for this visit:    Yeast infection  -     fluconazole (DIFLUCAN) 150 mg tablet; FDA advises cautious prescribing of oral fluconazole in pregnancy.   Take 1 tablet now and 1 when you finish your antibiotic., Normal, Disp-2 Tablet, R-0    Chronic mastitis of right breast  -     trimethoprim-sulfamethoxazole (BACTRIM DS, SEPTRA DS) 160-800 mg per tablet; Take 1 Tablet by mouth two (2) times a day for 10 days. , Normal, Disp-20 Tablet, R-0    *Plan of care reviewed with patient. Patient in agreement with plan and expresses understanding. All questions answered and patient encouraged to call or RTO if further questions or concerns. 50% of 25 minutes spent on counseling and managing her care. [id number] hours   Follow-up and Dispositions    · Return if symptoms worsen or fail to improve, for Follow-up in office in 2 weeks or sooner if no better.        Melissa Garcia, NP-C

## 2022-02-10 ENCOUNTER — OFFICE VISIT (OUTPATIENT)
Dept: FAMILY MEDICINE CLINIC | Age: 37
End: 2022-02-10
Payer: OTHER GOVERNMENT

## 2022-02-10 ENCOUNTER — HOSPITAL ENCOUNTER (OUTPATIENT)
Dept: LAB | Age: 37
Discharge: HOME | End: 2022-02-10
Payer: OTHER GOVERNMENT

## 2022-02-10 VITALS
HEART RATE: 89 BPM | OXYGEN SATURATION: 100 % | HEIGHT: 61 IN | WEIGHT: 145.2 LBS | TEMPERATURE: 97.9 F | SYSTOLIC BLOOD PRESSURE: 118 MMHG | BODY MASS INDEX: 27.41 KG/M2 | DIASTOLIC BLOOD PRESSURE: 79 MMHG | RESPIRATION RATE: 16 BRPM

## 2022-02-10 DIAGNOSIS — Z82.61 FAMILY HISTORY OF RHEUMATOID ARTHRITIS: ICD-10-CM

## 2022-02-10 DIAGNOSIS — M79.672 PAIN IN BOTH FEET: ICD-10-CM

## 2022-02-10 DIAGNOSIS — M79.671 PAIN IN BOTH FEET: ICD-10-CM

## 2022-02-10 DIAGNOSIS — M21.42 FLAT FEET, BILATERAL: ICD-10-CM

## 2022-02-10 DIAGNOSIS — R10.31 RIGHT GROIN PAIN: ICD-10-CM

## 2022-02-10 DIAGNOSIS — Z28.21 INFLUENZA VACCINATION DECLINED: ICD-10-CM

## 2022-02-10 DIAGNOSIS — Z28.21 COVID-19 VACCINATION DECLINED: Primary | ICD-10-CM

## 2022-02-10 DIAGNOSIS — M21.41 FLAT FEET, BILATERAL: ICD-10-CM

## 2022-02-10 DIAGNOSIS — L98.9 SKIN LESION OF BACK: ICD-10-CM

## 2022-02-10 LAB — RHEUMATOID FACT SERPL-ACNC: <10 IU/ML

## 2022-02-10 PROCEDURE — 36415 COLL VENOUS BLD VENIPUNCTURE: CPT

## 2022-02-10 PROCEDURE — 86200 CCP ANTIBODY: CPT

## 2022-02-10 PROCEDURE — 86431 RHEUMATOID FACTOR QUANT: CPT

## 2022-02-10 PROCEDURE — 99213 OFFICE O/P EST LOW 20 MIN: CPT | Performed by: NURSE PRACTITIONER

## 2022-02-10 NOTE — PATIENT INSTRUCTIONS
Rheumatoid Arthritis (RA): Care Instructions  Your Care Instructions     Arthritis is a common health problem in which the joints are inflamed. There are many types of arthritis. In rheumatoid arthritis, the body's own immune system attacks the joints. This causes pain, stiffness, and swelling in the joints, especially in the hands and feet. It can become hard to open jars, write, and do other daily tasks. Sometimes rheumatoid arthritis can also cause bumps to form under the skin. Over time, rheumatoid arthritis can damage and deform joints. Early treatment with medicines may reduce your chances of having a lasting disability. Follow-up care is a key part of your treatment and safety. Be sure to make and go to all appointments, and call your doctor if you are having problems. It's also a good idea to know your test results and keep a list of the medicines you take. How can you care for yourself at home? · If your doctor recommends it, get more exercise. Walking is a good choice. If your knees or ankles hurt, try riding a stationary bike or swimming. · Move each joint gently through its full range of motion once or twice a day. · Rest joints when they are sore or overworked. Short rest breaks may help more than staying in bed. · Reach and stay at a healthy weight. Regular exercise and a healthy diet will help you do this. Extra weight can strain the joints, especially the knees and hips, and make the pain worse. Losing even a few pounds may help. · Get enough calcium and vitamin D to help prevent osteoporosis, which causes thin bones. Talk to your doctor about how much you should take. · Protect your joints from injury. Do not overuse them. Try to limit or avoid activities that cause joint pain or swelling. Use special kitchen tools and other self-help devices as well as walkers, splints, or canes if needed. · Use heat to ease pain. Take warm showers or baths. Use hot packs or a heating pad set on low. Sleep under a warm electric blanket. · Put ice or a cold pack on the area for 10 to 20 minutes at a time. Put a thin cloth between the ice and your skin. · Take pain medicines exactly as directed. ? If the doctor gave you a prescription medicine for pain, take it as prescribed. ? If you are not taking a prescription pain medicine, ask your doctor if you can take an over-the-counter medicine. · Take an active role in managing your condition. Set up a treatment plan with your doctor, and learn as much as you can about rheumatoid arthritis. This will help you control pain and stay active. When should you call for help? Call your doctor now or seek immediate medical care if:    · You have a fever or a rash along with joint pain.     · You have joint pain that is so severe that you cannot use the joint at all.     · You have sudden swelling, redness, or pain in one or more joints, and you do not know why.     · You have back or neck pain along with weakness in your arms or legs.     · You have a loss of bowel or bladder control. Watch closely for changes in your health, and be sure to contact your doctor if:    · You have joint pain that lasts for more than 6 weeks.     · You have side effects from your arthritis medicines, such as stomach pain, nausea, heartburn, or dark and tarlike stools. Where can you learn more? Go to http://www.gray.com/  Enter K205 in the search box to learn more about \"Rheumatoid Arthritis (RA): Care Instructions. \"  Current as of: April 30, 2021               Content Version: 13.0  © 2006-2021 [employer]. Care instructions adapted under license by [employer] (which disclaims liability or warranty for this information). If you have questions about a medical condition or this instruction, always ask your healthcare professional. [place] [address] any warranty or liability for your use of this information.

## 2022-02-10 NOTE — PROGRESS NOTES
OFFICE NOTE    Raad Gordon is a 43 y.o. female presenting today for office visit. 2/10/2022  8:25 AM    Chief Complaint   Patient presents with    Follow-up       HPI:   In office visit. She is done nursing. Her mastitis resolved. Patient says she is well and she looks well. Patient has bilateral toe pain and a family history of RA. She would like labs related yto RA. She at times has right groin pain near her C section scar. Ordered US. Patient has a \"spot\" on her back, right anterior shoulder. Her bra strap rubs it. She says it is like a skin tag and blood blister but has changed recently. Patients last menstrual period was 1/29/2022     Patient reports appetite is good, no urinary issues, sleeps well and regular bowel movements. Patient denies fever, chills, chest pain, shortness of breath, abdomen pain or dark tarry stool. Previous audio visit 1/21/2022, audio only visit related to vaginal itching. Patient reports several days ago she had mastitis of the right breast this is a chronic issue for her. She says several days ago she had streaks on her breast with warmth and pain. She says the symptoms have resolved.     Patient says she is not pregnant and she has had a tubal ligation.     Patient reports no fever, chills shortness of breath chest pain or abdomen pain at this time.     Labs 10/12/2021  Hemoglobin A1c 4.7%. Vitamin D normal at 40.2. Cholesterol high at 213, triglycerides high at 157 and LDL high at 138.6. HDL good cholesterol low at 43. T4 and TSH normal.  Metabolic panel unremarkable. CBC unremarkable.   Urinalysis normal.    Covid vaccine, declined   Flu vaccine, declined   Pap smear, she current   Breast cancer screening at 40  Colorectal cancer screening at 45    ROS:    · General: negative for - chills, fever, weight changes or malaise  · HEENT: no sore throat, nasal congestion, vision problems or ear problems  · Respiratory: no cough, shortness of breath, or wheezing  · Cardiovascular: no chest pain, palpitations, or dyspnea on exertion  · Gastrointestinal: no abdominal pain, N/V, change in bowel habits, or black or bloody stools  · Musculoskeletal: positive bilateral toe pain, no back pain, joint pain, joint stiffness, muscle pain or muscle weakness  · Neurological: no numbness, tingling, headache or dizziness  · Endo:  No polyuria or polydipsia. · : no hematuria, dysuria, frequency, hesitancy, or nocturia.     · Skin: scab right side of back   · Psychological: negative for - anxiety, depression, sleep disturbances, suicidal or homicidal ideations     PHQ Screening   3 most recent PHQ Screens 2/10/2022   Little interest or pleasure in doing things -   Feeling down, depressed, irritable, or hopeless Not at all   Total Score PHQ 2 -       History  Past Medical History:   Diagnosis Date    Anxiety     Cystic acne     Hypertension     Seasonal affective disorder [facility])        Past Surgical History:   Procedure Laterality Date    HX  SECTION      , , 2019    HX MENISCECTOMY  2014    HX ORTHOPAEDIC  1998    foot, repair of fracture    HX ORTHOPAEDIC  2003    left shoulder surgery    HX TUBAL LIGATION  2019       Social History     Socioeconomic History    Marital status:      Spouse name: Not on file    Number of children: Not on file    Years of education: Not on file    Highest education level: Not on file   Occupational History    Occupation: [employer]   Tobacco Use    Smoking status: Never Smoker    Smokeless tobacco: Never Used   Vaping Use    Vaping Use: Never used   Substance and Sexual Activity    Alcohol use: Not Currently    Drug use: Never    Sexual activity: Yes     Partners: Male     Birth control/protection: Surgical   Other Topics Concern    Not on file   Social History Narrative    Not on file     Social Determinants of Health     Financial Resource Strain:     Difficulty of Paying Living Expenses: Not on file   Food Insecurity:     Worried About Running Out of Food in the Last Year: Not on file    Goldie of Food in the Last Year: Not on file   Transportation Needs:     Lack of Transportation (Medical): Not on file    Lack of Transportation (Non-Medical): Not on file   Physical Activity:     Days of Exercise per Week: Not on file    Minutes of Exercise per Session: Not on file   Stress:     Feeling of Stress : Not on file   Social Connections:     Frequency of Communication with Friends and Family: Not on file    Frequency of Social Gatherings with Friends and Family: Not on file    Attends Tenri[employer] Services: Not on file    Active Member of [address] [place] [address] [email or web address] or Organizations: Not on file    Attends Club or Organization Meetings: Not on file    Marital Status: Not on file   Intimate Partner Violence:     Fear of Current or Ex-Partner: Not on file    Emotionally Abused: Not on file    Physically Abused: Not on file    Sexually Abused: Not on file   Housing Stability:     Unable to Pay for Housing in the Last Year: Not on file    Number of [place] in the Last Year: Not on file    Unstable Housing in the Last Year: Not on file       Allergies   Allergen Reactions    Latex Hives    Penicillins Anaphylaxis    Clindamycin Rash       Current Outpatient Medications   Medication Sig Dispense Refill    azelaic acid (AZELEX) 20 % topical cream Apply  to affected area two (2) times a day. 50 g 1    cholecalciferol, vitamin D3, (Vitamin D3) 50 mcg (2,000 unit) tab Take  by mouth.  sertraline (ZOLOFT) 100 mg tablet Take 1 Tablet by mouth daily. 90 Tablet 0    chlorthalidone (HYGROTON) 25 mg tablet Take 1 Tablet by mouth daily.  Indications: high blood pressure 90 Tablet 1       Patient Care Team:  Patient Care Team:  Vane Moreno NP as PCP - General (Nurse Practitioner)  Vane Moreno NP as PCP - REHABILITATION [facility] Empaneled Provider    Physical Exam  Patient appears well, she is pleasant, alert, oriented x 3, in no distress. ENT normal.  Neck supple. No adenopathy or thyromegaly. RAY. Lungs are clear, good air entry, no wheezes, rhonchi or rales. Cardiovascular, S1 and S2 normal, no murmurs, regular rate and rhythm. No LAD. Chest wall negative for tenderness  Abdomen is soft without tenderness  /Anorectal, deferred. Muscleskeletal, no swelling  Extremities show no edema  Neurological is normal without focal findings. Skin: small scab anterior of right shoulder at bra strap  Psych: normal affect. Mood good. Oriented x 3. Judgement and insight intact. Vitals:    02/10/22 0937   BP: 118/79   Pulse: 89   Resp: 16   Temp: 97.9 °F (36.6 °C)   SpO2: 100%   Weight: 145 lb 3.2 oz (65.9 kg)   Height: 5' 1\" (1.549 m)   LMP: 01/29/2022       Assessment and Plan    Diagnoses and all orders for this visit:    COVID-19 vaccination declined    Influenza vaccination declined    Pain in both feet  -     REFERRAL TO PODIATRY  -     RHEUMATOID FACTOR, QT; Future  -     CYCLIC CITRUL PEPTIDE AB, IGG; Future    Family history of rheumatoid arthritis  -     RHEUMATOID FACTOR, QT; Future  -     CYCLIC CITRUL PEPTIDE AB, IGG; Future    Right groin pain  -     US EXT NONVAS RT LTD; Future    Skin lesion of back  -     REFERRAL TO DERMATOLOGY    Flat feet, bilateral  -     REFERRAL TO PODIATRY    *Plan of care reviewed with patient. Patient in agreement with plan and expresses understanding. All questions answered and patient encouraged to call or RTO if further questions or concerns. 50% of 20 minutes spent on counseling and managing her care. Follow-up and Dispositions    · Return in about 6 months (around 8/10/2022).        ERICA Jenkins

## 2022-02-10 NOTE — PROGRESS NOTES
Ariel Constantino is a 43 y.o. female (: 1985) presenting to address:    Chief Complaint   Patient presents with    Follow-up       Vitals:    02/10/22 0937   BP: 118/79   Pulse: 89   Resp: 16   Temp: 97.9 °F (36.6 °C)   SpO2: 100%   Weight: 145 lb 3.2 oz (65.9 kg)   Height: 5' 1\" (1.549 m)   LMP: 2022       Hearing/Vision:   No exam data present    Learning Assessment:   No flowsheet data found. Depression Screening:     3 most recent PHQ Screens 2/10/2022   Little interest or pleasure in doing things -   Feeling down, depressed, irritable, or hopeless Not at all   Total Score PHQ 2 -     Fall Risk Assessment:     Fall Risk Assessment, last 12 mths 2/15/2021   Able to walk? Yes     Abuse Screening:   No flowsheet data found. ADL Assessment:   No flowsheet data found. Coordination of Care Questionaire:   1. \"Have you been to the ER, urgent care clinic since your last visit? Hospitalized since your last visit? \" No    2. \"Have you seen or consulted any other health care providers outside of the [address] [place] [address] since your last visit? \" No     3. For patients aged 39-70: Has the patient had a colonoscopy? NA - based on age     If the patient is female:    4. For patients aged 41-77: Has the patient had a mammogram within the past 2 years? NA - based on age    11. For patients aged 21-65: Has the patient had a pap smear? Yes - no Care Gap present    Advanced Directive:   1. Do you have an Advanced Directive? NO    2. Would you like information on Advanced Directives?  NO

## 2022-02-11 LAB — CCP IGA+IGG SERPL IA-ACNC: 5 UNITS (ref 0–19)

## 2022-02-24 ENCOUNTER — HOSPITAL ENCOUNTER (OUTPATIENT)
Dept: ULTRASOUND IMAGING | Age: 37
Discharge: HOME | End: 2022-02-24
Attending: NURSE PRACTITIONER
Payer: OTHER GOVERNMENT

## 2022-02-24 DIAGNOSIS — R10.31 RIGHT GROIN PAIN: ICD-10-CM

## 2022-02-24 PROCEDURE — 76882 US LMTD JT/FCL EVL NVASC XTR: CPT

## 2022-03-07 DIAGNOSIS — F41.9 ANXIETY: ICD-10-CM

## 2022-03-07 DIAGNOSIS — F33.8 SEASONAL AFFECTIVE DISORDER (HCC): ICD-10-CM

## 2022-03-08 RX ORDER — SERTRALINE HYDROCHLORIDE 100 MG/1
100 TABLET, FILM COATED ORAL DAILY
Qty: 90 TABLET | Refills: 0 | Status: SHIPPED | OUTPATIENT
Start: 2022-03-08 | End: 2022-03-17 | Stop reason: SDUPTHER

## 2022-03-17 DIAGNOSIS — F41.9 ANXIETY: ICD-10-CM

## 2022-03-17 DIAGNOSIS — F33.8 SEASONAL AFFECTIVE DISORDER (HCC): ICD-10-CM

## 2022-03-17 NOTE — TELEPHONE ENCOUNTER
Requested Prescriptions     Pending Prescriptions Disp Refills    sertraline (ZOLOFT) 100 mg tablet 90 Tablet 0     Sig: Take 1 Tablet by mouth daily.

## 2022-03-20 RX ORDER — SERTRALINE HYDROCHLORIDE 100 MG/1
100 TABLET, FILM COATED ORAL DAILY
Qty: 90 TABLET | Refills: 0 | Status: SHIPPED | OUTPATIENT
Start: 2022-03-20

## 2022-04-26 ENCOUNTER — PATIENT MESSAGE (OUTPATIENT)
Dept: FAMILY MEDICINE CLINIC | Age: 37
End: 2022-04-26

## 2022-11-30 DIAGNOSIS — I10 ESSENTIAL HYPERTENSION: ICD-10-CM

## 2022-11-30 NOTE — TELEPHONE ENCOUNTER
This patient contacted the office for the following prescriptions to be refilled:    Medication requested :   Requested Prescriptions     Pending Prescriptions Disp Refills    chlorthalidone (HYGROTON) 25 mg tablet 90 Tablet 0     Sig: Take 1 Tablet by mouth daily. Indications: high blood pressure      PCP: Denisa De Guzman NP  LOV: 2/10/2022  NOV DMA: Visit date not found  FUTURE APPT: No future appointments. Thank you.

## 2022-12-01 RX ORDER — CHLORTHALIDONE 25 MG/1
25 TABLET ORAL DAILY
Qty: 90 TABLET | Refills: 0 | Status: SHIPPED | OUTPATIENT
Start: 2022-12-01

## 2023-04-06 NOTE — PATIENT INSTRUCTIONS
[facility]    [phone] Depth Of Tumor Invasion (For Histology): tumor not visualized (deep and peripheral margins are clear of tumor)